# Patient Record
Sex: FEMALE | Race: WHITE | URBAN - METROPOLITAN AREA
[De-identification: names, ages, dates, MRNs, and addresses within clinical notes are randomized per-mention and may not be internally consistent; named-entity substitution may affect disease eponyms.]

---

## 2023-04-20 ENCOUNTER — LAB OUTSIDE AN ENCOUNTER (OUTPATIENT)
Dept: URBAN - METROPOLITAN AREA CLINIC 40 | Facility: CLINIC | Age: 58
End: 2023-04-20

## 2023-04-20 ENCOUNTER — OFFICE VISIT (OUTPATIENT)
Dept: URBAN - METROPOLITAN AREA CLINIC 40 | Facility: CLINIC | Age: 58
End: 2023-04-20
Payer: COMMERCIAL

## 2023-04-20 ENCOUNTER — TELEPHONE ENCOUNTER (OUTPATIENT)
Dept: URBAN - METROPOLITAN AREA CLINIC 40 | Facility: CLINIC | Age: 58
End: 2023-04-20

## 2023-04-20 ENCOUNTER — WEB ENCOUNTER (OUTPATIENT)
Dept: URBAN - METROPOLITAN AREA CLINIC 40 | Facility: CLINIC | Age: 58
End: 2023-04-20

## 2023-04-20 VITALS
DIASTOLIC BLOOD PRESSURE: 92 MMHG | HEART RATE: 72 BPM | WEIGHT: 164 LBS | SYSTOLIC BLOOD PRESSURE: 130 MMHG | HEIGHT: 63 IN | BODY MASS INDEX: 29.06 KG/M2

## 2023-04-20 DIAGNOSIS — Z85.3 HISTORY OF BREAST CANCER: ICD-10-CM

## 2023-04-20 DIAGNOSIS — Z92.3 HISTORY OF RADIATION THERAPY: ICD-10-CM

## 2023-04-20 DIAGNOSIS — Z12.11 ENCOUNTER FOR SCREENING COLONOSCOPY: ICD-10-CM

## 2023-04-20 DIAGNOSIS — Z87.19 HISTORY OF IBS: ICD-10-CM

## 2023-04-20 DIAGNOSIS — F41.1 GENERALIZED ANXIETY DISORDER: ICD-10-CM

## 2023-04-20 DIAGNOSIS — R10.30 LOWER ABDOMINAL PAIN: ICD-10-CM

## 2023-04-20 DIAGNOSIS — K76.89 LIVER LESION: ICD-10-CM

## 2023-04-20 PROBLEM — 429479009: Status: ACTIVE | Noted: 2023-04-20

## 2023-04-20 PROCEDURE — 99204 OFFICE O/P NEW MOD 45 MIN: CPT | Performed by: PHYSICIAN ASSISTANT

## 2023-04-20 RX ORDER — SODIUM, POTASSIUM,MAG SULFATES 17.5-3.13G
177ML SOLUTION, RECONSTITUTED, ORAL ORAL
Qty: 1 | OUTPATIENT
Start: 2023-04-20 | End: 2023-04-22

## 2023-04-20 RX ORDER — FAMOTIDINE 20 MG/1
1 TABLET AT BEDTIME AS NEEDED TABLET, FILM COATED ORAL ONCE A DAY
Status: ACTIVE | COMMUNITY

## 2023-04-20 RX ORDER — DULOXETINE 20 MG/1
1 CAPSULE CAPSULE, DELAYED RELEASE PELLETS ORAL TWICE A DAY
Status: DISCONTINUED | COMMUNITY

## 2023-04-20 RX ORDER — MAGNESIUM 200 MG
2 TABLETS WITH A MEAL TABLET ORAL ONCE A DAY
Status: ACTIVE | COMMUNITY

## 2023-04-20 NOTE — PHYSICAL EXAM EYES:
Conjuntivae and eyelids appear normal, Sclerae : White without injection Detail Level: Detailed Quality 110: Preventive Care And Screening: Influenza Immunization: Influenza Immunization previously received during influenza season

## 2023-04-20 NOTE — HPI-TODAY'S VISIT:
Ms. Clark is a 57-year-old white female presents to the office today to schedule screening colonoscopy.  She has had no prior colon cancer screening to date.  Personal history of breast cancer and vulvar cancer treated remotely.  She has had radiation therapy for vulvar cancer in the past.  States that she had issues with her bowels for a long time, IBS diagnosed in her 30s.  In the last 5 years, several stressors including the loss of her middle son, grieving, depression with anxiety as a result of this and other medical issues including osteoporosis, left labrum tear and repair with orthopedic surgeon.  Chronic left-sided pain.  She was previously followed by Dr. Bains of [facility] who had recommended treatment of her constipation which was seen on a CT abdomen and pelvis from November 2022.  There was no evidence of obstruction or diverticulitis.  No inflammatory changes in the abdomen.  No lymphadenopathy.  Minimal atherosclerosis of the abdominal aorta.  She also had findings of a 0.6 cm lesion in the posterior left lobe of the liver which was likely benign as a cyst.  There was also an additional 1.9 cm left renal lesion which looked to be benign cyst.  The spleen, gallbladder, liver, pancreas and kidneys otherwise normal.  Overall, no findings to explain her chronic left-sided abdominal pain.  She declined colonoscopy with Dr. Bains and decided to return to come to our practice for second opinion of left-sided pain.  She has recently been prescribed Cymbalta but admits she has not begun this therapy yet due to some hesitation with use of long-term medication.  She is taking supplements for chronic pain, some Prilosec as needed for heartburn as well as famotidine as needed.  Denies nausea, vomiting or dysphagia.  No family history given of colorectal cancer.  Reports IBD in children.  She is a registered nurse and continues to work.  She does not use tobacco.  Rare alcohol.  No IV or recreational drug use reported.  She reports to have a GoLytely bowel prep at home, recently prescribed with consideration of colonoscopy with Dr. Bains.  Overall, she feels her constipation has improved. Patient to follow up with physician in 6 months. No treatment changes. Patient to have spirometry at the next office visit. Patient to start on Flonase. script sent to patient pharmacy by Dr. Mike Quinones.

## 2023-07-20 ENCOUNTER — OFFICE VISIT (OUTPATIENT)
Dept: URBAN - METROPOLITAN AREA SURGERY CENTER 30 | Facility: SURGERY CENTER | Age: 58
End: 2023-07-20
Payer: COMMERCIAL

## 2023-07-20 ENCOUNTER — CLAIMS CREATED FROM THE CLAIM WINDOW (OUTPATIENT)
Dept: URBAN - METROPOLITAN AREA CLINIC 4 | Facility: CLINIC | Age: 58
End: 2023-07-20
Payer: COMMERCIAL

## 2023-07-20 DIAGNOSIS — K63.89 OTHER SPECIFIED DISEASES OF INTESTINE: ICD-10-CM

## 2023-07-20 DIAGNOSIS — Z12.11 COLON CANCER SCREENING: ICD-10-CM

## 2023-07-20 DIAGNOSIS — K63.5 BENIGN COLON POLYP: ICD-10-CM

## 2023-07-20 DIAGNOSIS — K62.1 ANAL AND RECTAL POLYP: ICD-10-CM

## 2023-07-20 PROCEDURE — 45380 COLONOSCOPY AND BIOPSY: CPT | Performed by: INTERNAL MEDICINE

## 2023-07-20 PROCEDURE — 88305 TISSUE EXAM BY PATHOLOGIST: CPT | Performed by: PATHOLOGY

## 2023-07-20 PROCEDURE — G8907 PT DOC NO EVENTS ON DISCHARG: HCPCS | Performed by: INTERNAL MEDICINE

## 2023-08-25 ENCOUNTER — OFFICE VISIT (OUTPATIENT)
Dept: URBAN - METROPOLITAN AREA CLINIC 40 | Facility: CLINIC | Age: 58
End: 2023-08-25

## 2023-08-25 RX ORDER — MAGNESIUM 200 MG
2 TABLETS WITH A MEAL TABLET ORAL ONCE A DAY
Status: ACTIVE | COMMUNITY

## 2023-08-25 RX ORDER — FAMOTIDINE 20 MG/1
1 TABLET AT BEDTIME AS NEEDED TABLET, FILM COATED ORAL ONCE A DAY
Status: ACTIVE | COMMUNITY

## 2023-09-08 ENCOUNTER — DASHBOARD ENCOUNTERS (OUTPATIENT)
Age: 58
End: 2023-09-08

## 2023-09-08 PROBLEM — 428283002: Status: ACTIVE | Noted: 2023-09-08

## 2023-09-08 PROBLEM — 397881000: Status: ACTIVE | Noted: 2023-09-08

## 2023-10-12 ENCOUNTER — OFFICE VISIT (OUTPATIENT)
Dept: URBAN - METROPOLITAN AREA CLINIC 74 | Facility: CLINIC | Age: 58
End: 2023-10-12

## 2023-10-12 NOTE — HPI-TODAY'S VISIT:
The patient is 58-year-old female with past medical history as noted below is presenting to our clinic today to discuss her recent Colonoscopy results.  Procedure: -- Colonoscopy with polypectomy on 07/20/2023 by Dr. Conner noted perianal skin tags found on perianal exam. One 5 mm polyp in the transverse colon, removed with a cold biopsy forceps. Resected and retrieved. Two 3-4 mm polyps in the rectum, removed with a cold biopsy forceps. Resected and retrieved. Diverticulosis in the sigmoid colon and in the descending colon. Non-bleeding internal hemorrhoids. Repeat Colonoscopy in 3 years with 2 days prep. Biopsy with hyperplastic colon polyps.